# Patient Record
Sex: MALE | Race: OTHER | URBAN - METROPOLITAN AREA
[De-identification: names, ages, dates, MRNs, and addresses within clinical notes are randomized per-mention and may not be internally consistent; named-entity substitution may affect disease eponyms.]

---

## 2019-05-17 ENCOUNTER — EMERGENCY (EMERGENCY)
Facility: HOSPITAL | Age: 44
LOS: 0 days | Discharge: HOME | End: 2019-05-17
Attending: EMERGENCY MEDICINE
Payer: SELF-PAY

## 2019-05-17 VITALS
OXYGEN SATURATION: 98 % | TEMPERATURE: 100 F | HEIGHT: 70 IN | WEIGHT: 260.15 LBS | RESPIRATION RATE: 16 BRPM | DIASTOLIC BLOOD PRESSURE: 117 MMHG | HEART RATE: 120 BPM | SYSTOLIC BLOOD PRESSURE: 193 MMHG
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
## 2019-05-17 DIAGNOSIS — S80.861A INSECT BITE (NONVENOMOUS), RIGHT LOWER LEG, INITIAL ENCOUNTER: ICD-10-CM

## 2019-05-17 DIAGNOSIS — L03.115 CELLULITIS OF RIGHT LOWER LIMB: ICD-10-CM

## 2019-05-17 PROCEDURE — 99283 EMERGENCY DEPT VISIT LOW MDM: CPT

## 2019-05-17 RX ORDER — DIPHENHYDRAMINE HCL 50 MG
1 CAPSULE ORAL
Qty: 15 | Refills: 0
Start: 2019-05-17 | End: 2019-05-21

## 2019-05-17 RX ORDER — CEPHALEXIN 500 MG
1 CAPSULE ORAL
Qty: 20 | Refills: 0
Start: 2019-05-17 | End: 2019-05-26

## 2019-05-17 RX ORDER — IBUPROFEN 200 MG
1 TABLET ORAL
Qty: 20 | Refills: 0
Start: 2019-05-17 | End: 2019-05-21

## 2019-05-17 RX ORDER — CEPHALEXIN 500 MG
500 CAPSULE ORAL ONCE
Refills: 0 | Status: COMPLETED | OUTPATIENT
Start: 2019-05-17 | End: 2019-05-17

## 2019-05-17 RX ORDER — IBUPROFEN 200 MG
600 TABLET ORAL ONCE
Refills: 0 | Status: COMPLETED | OUTPATIENT
Start: 2019-05-17 | End: 2019-05-17

## 2019-05-17 RX ORDER — DIPHENHYDRAMINE HCL 50 MG
25 CAPSULE ORAL ONCE
Refills: 0 | Status: COMPLETED | OUTPATIENT
Start: 2019-05-17 | End: 2019-05-17

## 2019-05-17 RX ADMIN — Medication 500 MILLIGRAM(S): at 21:43

## 2019-05-17 RX ADMIN — Medication 600 MILLIGRAM(S): at 21:43

## 2019-05-17 NOTE — ED PROVIDER NOTE - PHYSICAL EXAMINATION
Vitals: HTN at 192/117, tachy at 120 (pt. endorses white coat syndrome)  Gen: AAOx3, NAD, sitting comfortably in stretcher, calm, cooperative, non-toxic   Head: ncat, perrla, eomi b/l  Neck: supple, no lymphadenopathy, no midline deviation  Heart: rrr, no m/r/g  Lungs: CTA b/l, no rales/ronchi/wheezes  Abd: soft, nontender, non-distended, no rebound or guarding  Ext: no clubbing/cyanosis/edema  Neuro: sensation and muscle strength intact b/l, steady gait   derm: two 7x5 cm area of blotchy erythema, well localized, well-circumscribed over medial and anterior RLE (lower anterior tibial and medial mid calf area), no trailing erythema, both areas are tender to palpation, warm to touch, no fluctuance under skin, no discharge expressible from lesions, no necrosis, no crepitus, no woody induration

## 2019-05-17 NOTE — ED PROVIDER NOTE - NSFOLLOWUPCLINICS_GEN_ALL_ED_FT
[facility]  Dermatology  [address]  [place] [address]  Phone: [phone]  Fax: [phone]  Follow Up Time: 1-3 Days    [facility]  Dermatology  [address]  [place] [address]  Phone: [phone]  Fax: [phone]  Follow Up Time: 1-3 Days    [facility]  Dermatology  [address], [address]  [place] [address]  Phone: [phone]  Fax:   Follow Up Time: 1-3 Days

## 2019-05-17 NOTE — ED ADULT NURSE NOTE - OBJECTIVE STATEMENT
patient states he was bite by a spider on the lower right leg. patient has ecchymotic area on the right lower leg.

## 2019-05-17 NOTE — ED ADULT NURSE NOTE - CHIEF COMPLAINT QUOTE
Pt presents c/o right foot lesions, that he states are spider bites, but he did not see the insect that bit him. States he was in tall grass in [place] last week and noticed them developing on Tuesday. No fever or chills. two affected areas are erythematous and warm to touch. No exudate noted. No hx of DM, states his BP Is always high at hospitals but no hx of htn

## 2019-05-17 NOTE — ED PROVIDER NOTE - OBJECTIVE STATEMENT
42 yo M with suspected spider bite of RLE.  Pt. was in [place] over the weekend, did some outdoor activities.  Pt. noticed a red rash develop in two areas along R anterior and medial RLE.  Areas are tender to touch, warm.  No pus drainage.  No other complaints or inciting event.  Pt. denies fever, but notes temp at triage was 100 exactly.  No other complaints, associated symptoms, or inciting event.  Pt. feels otherwise well.  (event happened over the weekend--about a week ago now, as per patient).    ROS: negative for fever, cough, headache, chest pain, shortness of breath, abd pain, nausea, vomiting, diarrhea, rash, paresthesia, and focal weakness--all other systems reviewed are negative.   PMH: Denies; Meds: Denies; SH: Denies smoking/drinking/drug use

## 2019-05-17 NOTE — ED PROVIDER NOTE - CLINICAL SUMMARY MEDICAL DECISION MAKING FREE TEXT BOX
42 yo M with lower extremity cellulitis after spider bite  -keflex, motrin, benadryl  -d/c with urgent derm f/u  -return instructions discussed with patient that would warrant immediately return to ER including worsening fever, chills, sweats, expanding area of erythema, new necrosis of wounds, trailing erythema up leg, vomiting, diffuse leg pain--pt. verbalizes understanding, understands the importance of f/u with derm and taking all of prescribed meds until complete

## 2019-05-17 NOTE — ED ADULT NURSE NOTE - NSIMPLEMENTINTERV_GEN_ALL_ED
[FreeTextEntry1] : doing well \par f/u in 1 year
Implemented All Universal Safety Interventions:  [place] to call system. Call bell, personal items and telephone within reach. Instruct patient to call for assistance. Room bathroom lighting operational. Non-slip footwear when patient is off stretcher. Physically safe environment: no spills, clutter or unnecessary equipment. Stretcher in lowest position, wheels locked, appropriate side rails in place.

## 2019-09-28 NOTE — ED ADULT TRIAGE NOTE - CHIEF COMPLAINT QUOTE
Pt presents c/o right foot lesions, that he states are spider bites, but he did not see the insect that bit him. States he was in tall grass in [place] last week and noticed them developing on Tuesday. No fever or chills. two affected areas are erythematous and warm to touch. No exudate noted. No hx of DM, states his BP Is always high at hospitals but no hx of htn
normal

## 2019-12-21 ENCOUNTER — EMERGENCY (EMERGENCY)
Facility: HOSPITAL | Age: 44
LOS: 0 days | Discharge: HOME | End: 2019-12-21
Attending: EMERGENCY MEDICINE
Payer: SELF-PAY

## 2019-12-21 VITALS
HEIGHT: 70 IN | RESPIRATION RATE: 18 BRPM | SYSTOLIC BLOOD PRESSURE: 143 MMHG | HEART RATE: 114 BPM | WEIGHT: 255.07 LBS | OXYGEN SATURATION: 98 % | TEMPERATURE: 100 F | DIASTOLIC BLOOD PRESSURE: 107 MMHG

## 2019-12-21 VITALS
DIASTOLIC BLOOD PRESSURE: 108 MMHG | RESPIRATION RATE: 18 BRPM | HEART RATE: 99 BPM | SYSTOLIC BLOOD PRESSURE: 156 MMHG | TEMPERATURE: 99 F | OXYGEN SATURATION: 99 %

## 2019-12-21 DIAGNOSIS — N50.811 RIGHT TESTICULAR PAIN: ICD-10-CM

## 2019-12-21 DIAGNOSIS — N44.2 BENIGN CYST OF TESTIS: ICD-10-CM

## 2019-12-21 DIAGNOSIS — I10 ESSENTIAL (PRIMARY) HYPERTENSION: ICD-10-CM

## 2019-12-21 DIAGNOSIS — N50.812 LEFT TESTICULAR PAIN: ICD-10-CM

## 2019-12-21 PROCEDURE — 99284 EMERGENCY DEPT VISIT MOD MDM: CPT

## 2019-12-21 PROCEDURE — 93971 EXTREMITY STUDY: CPT | Mod: 26,RT

## 2019-12-21 PROCEDURE — 76870 US EXAM SCROTUM: CPT | Mod: 26

## 2019-12-21 NOTE — ED PROVIDER NOTE - OBJECTIVE STATEMENT
Patient has had bilateral testicular masses for many years however noted mild increase in the past few weeks

## 2019-12-21 NOTE — ED PROVIDER NOTE - CARE PROVIDER_API CALL
Patrick Olmedo)  Urology  [address], [address] [place] [address]  Phone: [phone]  Fax: [phone]  Follow Up Time:

## 2019-12-21 NOTE — ED ADULT NURSE NOTE - OBJECTIVE STATEMENT
pt presents to the ED with c/o bilateral testicle pain, also states that right lower extremity is cooler then the left upon assessment of distal pulse RLE thready in comparison to LLE + Coolness to RLE

## 2019-12-21 NOTE — ED PROVIDER NOTE - NSFOLLOWUPINSTRUCTIONS_ED_ALL_ED_FT
Take tylenol as needed  for your benign testicular cyst. You can even gently ice it.     Follow up with the urologist.

## 2019-12-21 NOTE — ED ADULT NURSE NOTE - NS PRO AD BILL OF RIGHTS
Writer called patient's mother regarding results. Verbalized understanding and has no further questions.   Yes

## 2019-12-21 NOTE — ED ADULT TRIAGE NOTE - CHIEF COMPLAINT QUOTE
pt with hx of HTN not on medication complains of lumps in testicles, pt has had the lumps since 22 years old, pt states that they have gotten worse for this week. pt also had polyp to right feet

## 2019-12-21 NOTE — ED PROVIDER NOTE - PATIENT PORTAL LINK FT
You can access the FollowMyHealth Patient Portal offered by [facility] by registering at the following website: [email or web address]. By joining [employer]’s FollowMyHealth portal, you will also be able to view your health information using other applications (apps) compatible with our system.

## 2025-04-24 NOTE — ED PROVIDER NOTE - GENITOURINARY SCROTUM
Requested Prescriptions     Pending Prescriptions Disp Refills    blood glucose test strips (ONETOUCH ULTRA) strip 100 strip 1     Sig: Apply 1 each topically 2 times daily As needed.       Last Clinic Visit:  4/14/2025     Next Clinic Appointment:  Visit date not found   normal
